# Patient Record
(demographics unavailable — no encounter records)

---

## 2025-04-01 NOTE — ASSESSMENT
[FreeTextEntry1] : 1) Left and Right 5th toenail onychomycosis with pain/discomfort: aseptic debridement of mycotic toenails x 2, Rx: tavaborole topical solution daily 2) Corns: aseptic debridement of painful foot corns x 4, Rx: ammonium lactate 12% lotion BID 3) Left and Right hallux subungual hematoma: stable, no active bleed, they were aseptically debrided bilateral  Patient tolerated all treatments well and without any complications Follow up in 2 months [Verbal] : verbal [Patient] : patient [Good - alert, interested, motivated] : Good - alert, interested, motivated [Verbalizes knowledge/Understanding] : Verbalizes knowledge/understanding [Skin Care] : skin care [Pt responsibility to plan of care] : patient responsibility to plan of care

## 2025-04-01 NOTE — REVIEW OF SYSTEMS
[Skin Lesions] : skin lesion [Skin Wound] : no skin wound [Itching] : no itching [Confused] : no confusion [Convulsions] : no convulsions [Dizziness] : no dizziness [Fainting] : no fainting [Negative] : Psychiatric

## 2025-04-01 NOTE — REASON FOR VISIT
[Follow-Up Visit] : a follow-up visit for [FreeTextEntry2] : Left and Right hallux subungual hematoma, Left and Right 5th toenail onychomycosis, corns

## 2025-04-01 NOTE — PHYSICAL EXAM
[General Appearance - Alert] : alert [General Appearance - In No Acute Distress] : in no acute distress [General Appearance - Well Nourished] : well nourished [General Appearance - Well Developed] : well developed [General Appearance - Well-Appearing] : healthy appearing [Delayed in the Right Toes] : capillary refills normal in right toes [Delayed in the Left Toes] : capillary refills normal in the left toes [2+] : left foot dorsalis pedis 2+ [FreeTextEntry3] : skin temperature gradient is WNL to bilateral lower extremities [] : normal strength/tone [de-identified] : ROM of ankle, subtalar, midtarsal, MPJ, IPJ are adequate and nontender bilateral 5/5 muscle power in inversion, eversion, dorsiflexion, plantarflexion bilateral [Skin Turgor] : normal skin turgor [Foot Ulcer] : no foot ulcer [FreeTextEntry1] : No open lesions, no cellulitis, no fluctuance, no ecchymosis, no edema  [Sensation] : the sensory exam was normal to light touch and pinprick [No Focal Deficits] : no focal deficits [Oriented To Time, Place, And Person] : oriented to person, place, and time [Impaired Insight] : insight and judgment were intact [Affect] : the affect was normal

## 2025-04-01 NOTE — PROCEDURE
[FreeTextEntry1] : 1) Left and Right 5th toenail onychomycosis with pain/discomfort: aseptic debridement of mycotic toenails x 2, Rx: tavaborole topical solution daily 2) Corns: aseptic debridement of painful foot corns x 4  Patient tolerated all treatments well and without any complications

## 2025-04-01 NOTE — HISTORY OF PRESENT ILLNESS
[FreeTextEntry1] : Ms. DOROTA HADDAD is a 20 year female who is seen in the office for Left and Right hallux subungual hematoma, Left and Right 5th toenail onychomycosis, corns. Patient denies any current or recent fever, chills, nausea, vomiting, SOB, or chest pain. AAOx3 and in NAD.